# Patient Record
Sex: MALE | Race: ASIAN
[De-identification: names, ages, dates, MRNs, and addresses within clinical notes are randomized per-mention and may not be internally consistent; named-entity substitution may affect disease eponyms.]

---

## 2019-03-05 ENCOUNTER — HOSPITAL ENCOUNTER (EMERGENCY)
Dept: HOSPITAL 72 - EMR | Age: 24
Discharge: HOME | End: 2019-03-05
Payer: COMMERCIAL

## 2019-03-05 VITALS — DIASTOLIC BLOOD PRESSURE: 114 MMHG | SYSTOLIC BLOOD PRESSURE: 162 MMHG

## 2019-03-05 VITALS — DIASTOLIC BLOOD PRESSURE: 85 MMHG | SYSTOLIC BLOOD PRESSURE: 134 MMHG

## 2019-03-05 VITALS — DIASTOLIC BLOOD PRESSURE: 72 MMHG | SYSTOLIC BLOOD PRESSURE: 145 MMHG

## 2019-03-05 VITALS — BODY MASS INDEX: 42.44 KG/M2 | HEIGHT: 68 IN | WEIGHT: 280 LBS

## 2019-03-05 VITALS — SYSTOLIC BLOOD PRESSURE: 145 MMHG | DIASTOLIC BLOOD PRESSURE: 72 MMHG

## 2019-03-05 DIAGNOSIS — R41.82: ICD-10-CM

## 2019-03-05 DIAGNOSIS — F16.10: Primary | ICD-10-CM

## 2019-03-05 LAB
ADD MANUAL DIFF: NO
ALBUMIN SERPL-MCNC: 5.2 G/DL (ref 3.4–5)
ALBUMIN/GLOB SERPL: 1.2 {RATIO} (ref 1–2.7)
ALP SERPL-CCNC: 61 U/L (ref 46–116)
ALT SERPL-CCNC: 49 U/L (ref 12–78)
ANION GAP SERPL CALC-SCNC: 24 MMOL/L (ref 5–15)
APPEARANCE UR: CLEAR
APTT PPP: (no result) S
AST SERPL-CCNC: 26 U/L (ref 15–37)
BASOPHILS NFR BLD AUTO: 1.3 % (ref 0–2)
BILIRUB SERPL-MCNC: 0.5 MG/DL (ref 0.2–1)
BUN SERPL-MCNC: 14 MG/DL (ref 7–18)
CALCIUM SERPL-MCNC: 10.2 MG/DL (ref 8.5–10.1)
CHLORIDE SERPL-SCNC: 99 MMOL/L (ref 98–107)
CK SERPL-CCNC: 151 U/L (ref 26–308)
CO2 SERPL-SCNC: 16 MMOL/L (ref 21–32)
CREAT SERPL-MCNC: 2 MG/DL (ref 0.55–1.3)
EOSINOPHIL NFR BLD AUTO: 0.7 % (ref 0–3)
ERYTHROCYTE [DISTWIDTH] IN BLOOD BY AUTOMATED COUNT: 11.3 % (ref 11.6–14.8)
GLOBULIN SER-MCNC: 4.3 G/DL
GLUCOSE UR STRIP-MCNC: NEGATIVE MG/DL
HCT VFR BLD CALC: 46.7 % (ref 42–52)
HGB BLD-MCNC: 15.6 G/DL (ref 14.2–18)
KETONES UR QL STRIP: NEGATIVE
LEUKOCYTE ESTERASE UR QL STRIP: NEGATIVE
LYMPHOCYTES NFR BLD AUTO: 27.5 % (ref 20–45)
MCV RBC AUTO: 88 FL (ref 80–99)
MONOCYTES NFR BLD AUTO: 3.4 % (ref 1–10)
NEUTROPHILS NFR BLD AUTO: 67.2 % (ref 45–75)
NITRITE UR QL STRIP: NEGATIVE
PH UR STRIP: 5 [PH] (ref 4.5–8)
PLATELET # BLD: 501 K/UL (ref 150–450)
POTASSIUM SERPL-SCNC: 3.8 MMOL/L (ref 3.5–5.1)
PROT UR QL STRIP: (no result)
RBC # BLD AUTO: 5.33 M/UL (ref 4.7–6.1)
SODIUM SERPL-SCNC: 138 MMOL/L (ref 136–145)
SP GR UR STRIP: 1.01 (ref 1–1.03)
UROBILINOGEN UR-MCNC: NORMAL MG/DL (ref 0–1)
WBC # BLD AUTO: 12.3 K/UL (ref 4.8–10.8)

## 2019-03-05 PROCEDURE — 80329 ANALGESICS NON-OPIOID 1 OR 2: CPT

## 2019-03-05 PROCEDURE — 80053 COMPREHEN METABOLIC PANEL: CPT

## 2019-03-05 PROCEDURE — 84484 ASSAY OF TROPONIN QUANT: CPT

## 2019-03-05 PROCEDURE — 85025 COMPLETE CBC W/AUTO DIFF WBC: CPT

## 2019-03-05 PROCEDURE — 36415 COLL VENOUS BLD VENIPUNCTURE: CPT

## 2019-03-05 PROCEDURE — 96372 THER/PROPH/DIAG INJ SC/IM: CPT

## 2019-03-05 PROCEDURE — 83605 ASSAY OF LACTIC ACID: CPT

## 2019-03-05 PROCEDURE — 96375 TX/PRO/DX INJ NEW DRUG ADDON: CPT

## 2019-03-05 PROCEDURE — 99284 EMERGENCY DEPT VISIT MOD MDM: CPT

## 2019-03-05 PROCEDURE — 96365 THER/PROPH/DIAG IV INF INIT: CPT

## 2019-03-05 PROCEDURE — 81003 URINALYSIS AUTO W/O SCOPE: CPT

## 2019-03-05 PROCEDURE — 80307 DRUG TEST PRSMV CHEM ANLYZR: CPT

## 2019-03-05 PROCEDURE — 82550 ASSAY OF CK (CPK): CPT

## 2019-03-05 PROCEDURE — 84443 ASSAY THYROID STIM HORMONE: CPT

## 2019-03-05 NOTE — EMERGENCY ROOM REPORT
History of Present Illness


General


Source:  Patient, EMS





Present Illness


HPI


Patient is 23-year-old male brought in by EMS after increased altered level 

consciousness.  Patient was reportedly at a museum and had ingested LSD per 

friend report.  Patient was noted to have increased agitation as well as 

altered mental status.Patient was noted to speak Mandarin.


Allergies:  


Coded Allergies:  


     UNABLE TO ASSESS (Unverified , 3/5/19)


 aloc





Patient History


Past Medical History:  see triage record


Reviewed Nursing Documentation:  PMH: Agreed; PSxH: Agreed





Review of Systems


All Other Systems:  limited - Mental status.





Physical Exam


Sp02 EP Interpretation:  reviewed, normal


General Appearance:  normal inspection, obese


Head:  atraumatic


Eyes:  bilateral eye other - pupils dilated


ENT:  normal ENT inspection, hearing grossly normal, normal voice


Neck:  normal inspection, full range of motion, supple, no bony tend


Respiratory:  normal inspection, lungs clear, normal breath sounds, no 

respiratory distress, no retraction, no wheezing


Cardiovascular #1:  no edema, tachycardia


Gastrointestinal:  normal inspection, normal bowel sounds, non tender, soft, no 

guarding, no hernia


Genitourinary:  no CVA tenderness


Musculoskeletal:  normal inspection, back normal, normal range of motion


Neurologic:  normal inspection, alert, responsive, speech normal


Psychiatric:  normal inspection, judgement/insight normal, mood/affect normal


Skin:  normal inspection, normal color, no rash





Procedures


Critical Care Time


Critical Care Time


Patient had a critical medical condition which untreated could potentially 

result in life or limb threatening injury.  Total  critical care time excluding 

procedures approximately 45 minutes.





Medical Decision Making


Diagnostic Impression:  


 Primary Impression:  


 Substance abuse


ER Course


Patient presented for altered mental status.  Differential diagnosis included 

but was not limited to ischemic stroke, subarachnoid hemorrhage, hypoglycemia, 

spinal cord injury, neurodegenerative disorder, urinary tract infection, 

hypoxemia.  Because of complexity of patient's case laboratory testing and 

imaging studies were ordered.  Patient was noted to be agitated and tachycardic 

with a rapid heart rate.Patient was given Ativan and started on IV fluids.  

Poison control was contacted due to patient's ingestion of LSD.  Patient is 

noted to be initially febrile and tachycardic with a heart rate approximately 

170s.  Patient was started on IV fluids.  He was given Tylenol.  Patient was 

given multiple medications due to market agitation.  Patient was noted to have 

improvement in his agitation subsequently.  Patient was started on IV fluids 

and was noted to have improvement in his lactic acidosis.  Patient was noted to 

be urinating normally.  At the time of discharge patient was awake alert and 

oriented x3.  He was able to ambulate without assistance.  Patient was advised 

to stop using drugs.





Labs








Test


  3/5/19


14:30 3/5/19


15:55


 


White Blood Count


  12.3 K/UL


(4.8-10.8) 


 


 


Red Blood Count


  5.33 M/UL


(4.70-6.10) 


 


 


Hemoglobin


  15.6 G/DL


(14.2-18.0) 


 


 


Hematocrit


  46.7 %


(42.0-52.0) 


 


 


Mean Corpuscular Volume 88 FL (80-99)  


 


Mean Corpuscular Hemoglobin


  29.3 PG


(27.0-31.0) 


 


 


Mean Corpuscular Hemoglobin


Concent 33.5 G/DL


(32.0-36.0) 


 


 


Red Cell Distribution Width


  11.3 %


(11.6-14.8) 


 


 


Platelet Count


  501 K/UL


(150-450) 


 


 


Mean Platelet Volume


  5.7 FL


(6.5-10.1) 


 


 


Neutrophils (%) (Auto)


  67.2 %


(45.0-75.0) 


 


 


Lymphocytes (%) (Auto)


  27.5 %


(20.0-45.0) 


 


 


Monocytes (%) (Auto)


  3.4 %


(1.0-10.0) 


 


 


Eosinophils (%) (Auto)


  0.7 %


(0.0-3.0) 


 


 


Basophils (%) (Auto)


  1.3 %


(0.0-2.0) 


 


 


Urine Color Pale yellow  


 


Urine Appearance Clear  


 


Urine pH 5 (4.5-8.0)  


 


Urine Specific Gravity


  1.015


(1.005-1.035) 


 


 


Urine Protein 2+ (NEGATIVE)  


 


Urine Glucose (UA)


  Negative


(NEGATIVE) 


 


 


Urine Ketones


  Negative


(NEGATIVE) 


 


 


Urine Blood 4+ (NEGATIVE)  


 


Urine Nitrite


  Negative


(NEGATIVE) 


 


 


Urine Bilirubin


  Negative


(NEGATIVE) 


 


 


Urine Urobilinogen


  Normal MG/DL


(0.0-1.0) 


 


 


Urine Leukocyte Esterase


  Negative


(NEGATIVE) 


 


 


Urine RBC


  10-15 /HPF (0


- 0) 


 


 


Urine WBC 0 /HPF (0 - 0)  


 


Urine Squamous Epithelial


Cells None /LPF


(NONE/OCC) 


 


 


Urine Bacteria


  None /HPF


(NONE) 


 


 


Sodium Level


  138 MMOL/L


(136-145) 


 


 


Potassium Level


  3.8 MMOL/L


(3.5-5.1) 


 


 


Chloride Level


  99 MMOL/L


() 


 


 


Carbon Dioxide Level


  16 MMOL/L


(21-32) 


 


 


Anion Gap


  24 mmol/L


(5-15) 


 


 


Blood Urea Nitrogen


  14 mg/dL


(7-18) 


 


 


Creatinine


  2.0 MG/DL


(0.55-1.30) 


 


 


Estimat Glomerular Filtration


Rate 42.0 mL/min


(>60) 


 


 


Glucose Level


  248 MG/DL


() 


 


 


Calcium Level


  10.2 MG/DL


(8.5-10.1) 


 


 


Total Bilirubin


  0.5 MG/DL


(0.2-1.0) 


 


 


Aspartate Amino Transf


(AST/SGOT) 26 U/L (15-37) 


  


 


 


Alanine Aminotransferase


(ALT/SGPT) 49 U/L (12-78) 


  


 


 


Alkaline Phosphatase


  61 U/L


() 


 


 


Total Creatine Kinase


  151 U/L


() 


 


 


Troponin I


  0.009 ng/mL


(0.000-0.056) 


 


 


Total Protein


  9.5 G/DL


(6.4-8.2) 


 


 


Albumin


  5.2 G/DL


(3.4-5.0) 


 


 


Globulin 4.3 g/dL  


 


Albumin/Globulin Ratio 1.2 (1.0-2.7)  


 


Thyroid Stimulating Hormone


(TSH) 1.375 uiU/mL


(0.358-3.740) 


 


 


Salicylates Level


  1.5 ug/mL


(2.8-20) 


 


 


Urine Opiates Screen


  Negative


(NEGATIVE) 


 


 


Acetaminophen Level


  < 2 MCG/ML


(10-30) 


 


 


Urine Barbiturates Screen


  Negative


(NEGATIVE) 


 


 


Phencyclidine (PCP) Screen


  Negative


(NEGATIVE) 


 


 


Urine Amphetamines Screen


  Negative


(NEGATIVE) 


 


 


Urine Benzodiazepines Screen


  Negative


(NEGATIVE) 


 


 


Urine Cocaine Screen


  Negative


(NEGATIVE) 


 


 


Urine Marijuana (THC) Screen


  Positive


(NEGATIVE) 


 


 


Serum Alcohol < 3 mg/dL  


 


Lactic Acid Level


  


  3.60 mmol/L


(0.66-2.22)








EKG Diagnostic Results


Rate:  tachycardiac - 167


Rhythm:  NSR


ST Segments:  no acute changes


Status:  improved


Disposition:  HOME, SELF-CARE


Condition:  Stable











Javier Larkin MD Mar 5, 2019 14:30